# Patient Record
Sex: MALE | Race: WHITE | NOT HISPANIC OR LATINO | URBAN - METROPOLITAN AREA
[De-identification: names, ages, dates, MRNs, and addresses within clinical notes are randomized per-mention and may not be internally consistent; named-entity substitution may affect disease eponyms.]

---

## 2017-07-26 PROBLEM — 398050005 DIVERTICULAR DISEASE OF COLON: Status: ACTIVE | Noted: 2017-07-26

## 2017-07-26 PROBLEM — 38341003 HYPERTENSION: Status: ACTIVE | Noted: 2017-07-26

## 2017-07-26 PROBLEM — 13644009 HYPERCHOLESTEROLEMIA: Status: ACTIVE | Noted: 2017-07-26

## 2020-06-24 ENCOUNTER — OFFICE VISIT (OUTPATIENT)
Dept: URBAN - METROPOLITAN AREA CLINIC 13 | Facility: CLINIC | Age: 75
End: 2020-06-24

## 2020-07-09 ENCOUNTER — OFFICE VISIT (OUTPATIENT)
Dept: URBAN - METROPOLITAN AREA CLINIC 13 | Facility: CLINIC | Age: 75
End: 2020-07-09

## 2020-10-20 ENCOUNTER — OFFICE VISIT (OUTPATIENT)
Dept: URBAN - METROPOLITAN AREA CLINIC 13 | Facility: CLINIC | Age: 75
End: 2020-10-20

## 2021-02-01 ENCOUNTER — OFFICE VISIT (OUTPATIENT)
Dept: URBAN - METROPOLITAN AREA CLINIC 13 | Facility: CLINIC | Age: 76
End: 2021-02-01

## 2021-05-24 ENCOUNTER — OFFICE VISIT (OUTPATIENT)
Dept: URBAN - METROPOLITAN AREA CLINIC 13 | Facility: CLINIC | Age: 76
End: 2021-05-24

## 2021-08-17 ENCOUNTER — OFFICE VISIT (OUTPATIENT)
Dept: URBAN - METROPOLITAN AREA CLINIC 44 | Facility: CLINIC | Age: 76
End: 2021-08-17

## 2021-08-17 NOTE — HPI-H. PYLORI
76 year old male with a personal h/o  hemochromatosis and CRC in 1999 (Dr Ray did surgery). Last colonoscopy was in 2017 with diverticulosis and right hemicolectomy; in recall for surveillance 7/2022. He also has diagnosis of hemochromatosis (2001) which has been treated with phlebotomies up 2010 when his saturation and ferritin stabilized. Labs were just checked 6/8 with normal CBC and ferritin of 14.He had a hepatic function panel checked by Dr. Liu a week ago which was reviewed by me in EPIC chart, and LFT's are normal; lipid panel also unremarkable. He is doing well with no concerns. He denies any abdominal pain, change in bowels, blood in stool, or weight loss. He does take OTC Nexium daily for reflux with good control, and has had return of symptoms in the past when he has tried to wean off. Calcium and Vitamin D in February of this year were normal, and he takes a daily Calcium and Vitamin D supplement since the COVID pandemic began; he does not take a Vit C supplement as it increases iron absorption. He has not required phlebotomy in years.

## 2021-08-28 ENCOUNTER — TELEPHONE ENCOUNTER (OUTPATIENT)
Dept: URBAN - METROPOLITAN AREA CLINIC 13 | Facility: CLINIC | Age: 76
End: 2021-08-28

## 2021-08-29 ENCOUNTER — TELEPHONE ENCOUNTER (OUTPATIENT)
Dept: URBAN - METROPOLITAN AREA CLINIC 13 | Facility: CLINIC | Age: 76
End: 2021-08-29

## 2021-08-29 RX ORDER — ESOMEPRAZOLE MAGNESIUM 40 MG/1
GRANULE, DELAYED RELEASE ORAL
Status: ACTIVE | COMMUNITY

## 2021-08-29 RX ORDER — ENALAPRIL MALEATE 2.5 MG/1
TABLET ORAL
Status: ACTIVE | COMMUNITY

## 2021-08-29 RX ORDER — ASPIRIN 81 MG/1
TABLET ORAL
Status: ACTIVE | COMMUNITY

## 2021-08-29 RX ORDER — ROSUVASTATIN CALCIUM 5 MG
TABLET ORAL
Status: ACTIVE | COMMUNITY

## 2021-12-15 ENCOUNTER — OFFICE VISIT (OUTPATIENT)
Dept: URBAN - METROPOLITAN AREA CLINIC 44 | Facility: CLINIC | Age: 76
End: 2021-12-15
Payer: MEDICARE

## 2021-12-15 DIAGNOSIS — Z85.038 PERSONAL HISTORY OF COLON CANCER: ICD-10-CM

## 2021-12-15 DIAGNOSIS — E83.119 HEMOCHROMATOSIS: ICD-10-CM

## 2021-12-15 DIAGNOSIS — K57.30 DVRTCLOS OF LG INT W/O PERFORATION OR ABSCESS W/O BLEEDING: ICD-10-CM

## 2021-12-15 PROCEDURE — 99213 OFFICE O/P EST LOW 20 MIN: CPT | Performed by: INTERNAL MEDICINE

## 2021-12-15 RX ORDER — ROSUVASTATIN CALCIUM 5 MG
TABLET ORAL
Status: ACTIVE | COMMUNITY

## 2021-12-15 RX ORDER — ASPIRIN 81 MG/1
TABLET ORAL
Status: ACTIVE | COMMUNITY

## 2021-12-15 RX ORDER — CHOLECALCIFEROL (VITAMIN D3) 50 MCG
2 TABLET TABLET ORAL
Status: ACTIVE | COMMUNITY

## 2021-12-15 RX ORDER — ENALAPRIL MALEATE 2.5 MG/1
TABLET ORAL
Status: ACTIVE | COMMUNITY

## 2021-12-15 RX ORDER — ESOMEPRAZOLE MAGNESIUM 40 MG/1
GRANULE, DELAYED RELEASE ORAL
Status: ACTIVE | COMMUNITY

## 2021-12-15 RX ORDER — ZINC GLUCONATE 50 MG
1 TABLET TABLET ORAL ONCE A DAY
Status: ACTIVE | COMMUNITY

## 2021-12-15 NOTE — HPI-H. PYLORI
76 eduin old male with a personal history of hemochromatosis and CRC in 1999 (Dr Ray did surgery). Last colonoscopy was in 2017 with diverticulosis and right hemicolectomy; in recall for surveillance 7/2022. He also has diagnosis of hemochromatosis (2001) which has been treated with phlebotomies up 2010 when his saturation and ferritin stabilized. Labs have been checked regularly with normal CBC and low  ferritin. LFT's are normal; lipid panel also unremarkable. He is doing well with no concerns. He denies any abdominal pain, change in bowels, blood in stool, or weight loss. He does take OTC Nexium daily for reflux with good control, and has had return of symptoms in the past when he has tried to wean off. Calcium and vitamin D  were normal, and he takes a daily Calcium and vitamin D supplement since the COVID-19 pandemic began; he does not take a Vit C supplement as it increases iron absorption. He has not required phlebotomy in years. He is happy with his current GI status.   Labs in October show Hgb in normal range. Ferritin was low at 28.

## 2022-04-13 ENCOUNTER — TELEPHONE ENCOUNTER (OUTPATIENT)
Dept: URBAN - METROPOLITAN AREA CLINIC 44 | Facility: CLINIC | Age: 77
End: 2022-04-13

## 2022-04-13 PROBLEM — 399187006 HEMOCHROMATOSIS: Status: ACTIVE | Noted: 2022-04-13

## 2022-06-07 ENCOUNTER — TELEPHONE ENCOUNTER (OUTPATIENT)
Dept: URBAN - METROPOLITAN AREA CLINIC 44 | Facility: CLINIC | Age: 77
End: 2022-06-07

## 2022-06-07 VITALS — HEIGHT: 69 IN | BODY MASS INDEX: 23.7 KG/M2 | WEIGHT: 160 LBS

## 2022-06-07 RX ORDER — ROSUVASTATIN CALCIUM 5 MG
1 TABLET TABLET ORAL ONCE A DAY
Status: ACTIVE | COMMUNITY

## 2022-06-07 RX ORDER — ZINC GLUCONATE 50 MG
1 TABLET TABLET ORAL ONCE A DAY
Status: ACTIVE | COMMUNITY

## 2022-06-07 RX ORDER — ENALAPRIL MALEATE 2.5 MG/1
1 TABLET TABLET ORAL ONCE A DAY
Status: ACTIVE | COMMUNITY

## 2022-06-07 RX ORDER — CHOLECALCIFEROL (VITAMIN D3) 50 MCG
2 TABLET TABLET ORAL
Status: ACTIVE | COMMUNITY

## 2022-06-07 RX ORDER — ASPIRIN 81 MG/1
TABLET ORAL
Status: ACTIVE | COMMUNITY

## 2022-06-07 RX ORDER — ESOMEPRAZOLE MAGNESIUM 40 MG/1
1 CAPSULE CAPSULE, DELAYED RELEASE ORAL ONCE A DAY
Status: ACTIVE | COMMUNITY

## 2022-06-14 ENCOUNTER — TELEPHONE ENCOUNTER (OUTPATIENT)
Dept: URBAN - METROPOLITAN AREA CLINIC 46 | Facility: CLINIC | Age: 77
End: 2022-06-14

## 2022-06-14 ENCOUNTER — LAB OUTSIDE AN ENCOUNTER (OUTPATIENT)
Dept: URBAN - METROPOLITAN AREA CLINIC 44 | Facility: CLINIC | Age: 77
End: 2022-06-14

## 2023-04-25 ENCOUNTER — CLAIMS CREATED FROM THE CLAIM WINDOW (OUTPATIENT)
Dept: URBAN - METROPOLITAN AREA CLINIC 48 | Facility: CLINIC | Age: 78
End: 2023-04-25
Payer: MEDICARE

## 2023-04-25 ENCOUNTER — LAB OUTSIDE AN ENCOUNTER (OUTPATIENT)
Dept: URBAN - METROPOLITAN AREA CLINIC 48 | Facility: CLINIC | Age: 78
End: 2023-04-25

## 2023-04-25 VITALS
SYSTOLIC BLOOD PRESSURE: 174 MMHG | BODY MASS INDEX: 25.18 KG/M2 | DIASTOLIC BLOOD PRESSURE: 81 MMHG | HEART RATE: 65 BPM | WEIGHT: 170 LBS | HEIGHT: 69 IN | TEMPERATURE: 97.5 F

## 2023-04-25 DIAGNOSIS — K57.30 DVRTCLOS OF LG INT W/O PERFORATION OR ABSCESS W/O BLEEDING: ICD-10-CM

## 2023-04-25 DIAGNOSIS — E83.118 OTHER HEMOCHROMATOSIS: ICD-10-CM

## 2023-04-25 DIAGNOSIS — Z85.038 PERSONAL HISTORY OF COLON CANCER: ICD-10-CM

## 2023-04-25 DIAGNOSIS — K21.9 GASTROESOPHAGEAL REFLUX DISEASE WITHOUT ESOPHAGITIS: ICD-10-CM

## 2023-04-25 PROCEDURE — 99214 OFFICE O/P EST MOD 30 MIN: CPT | Performed by: NURSE PRACTITIONER

## 2023-04-25 RX ORDER — ESOMEPRAZOLE MAGNESIUM 20 MG/1
1 CAPSULE CAPSULE, DELAYED RELEASE ORAL ONCE A DAY
Status: ACTIVE | COMMUNITY

## 2023-04-25 RX ORDER — CHOLECALCIFEROL (VITAMIN D3) 50 MCG
2 TABLET TABLET ORAL
Status: ACTIVE | COMMUNITY

## 2023-04-25 RX ORDER — ESOMEPRAZOLE MAGNESIUM 20 MG/1
1 CAPSULE CAPSULE, DELAYED RELEASE ORAL ONCE A DAY
Qty: 30 | OUTPATIENT
Start: 2023-04-25

## 2023-04-25 RX ORDER — ASPIRIN 81 MG/1
TABLET ORAL
Status: ACTIVE | COMMUNITY

## 2023-04-25 RX ORDER — ZINC GLUCONATE 50 MG
1 TABLET TABLET ORAL ONCE A DAY
Status: ACTIVE | COMMUNITY

## 2023-04-25 RX ORDER — ROSUVASTATIN CALCIUM 5 MG
1 TABLET TABLET ORAL ONCE A DAY
Status: ACTIVE | COMMUNITY

## 2023-04-25 RX ORDER — ENALAPRIL MALEATE 2.5 MG/1
1 TABLET TABLET ORAL ONCE A DAY
Status: ACTIVE | COMMUNITY

## 2023-04-25 NOTE — HPI-TODAY'S VISIT:
77 year old male with a personal history of CRC s/p right hemicolectomy in 1999 (Dr Rya did surgery). Last colonoscopy was in 2017 with diverticulosis and right hemicolectomy. He also has diagnosis of hemochromatosis (2001) which has been treated with phlebotomies up 2010 when his saturation and ferritin stabilized. Labs have been checked regularly with normal CBC and low  ferritin. 3/3/23 iron panel showed iron 204, TIBC 300, and iron sat 68. 12/2022 Hgb was 15. LFTs are normal. He is doing well with no concerns. He denies any abdominal pain, change in bowels, blood in stool, or weight loss. GERD is well controlled with OTC Nexium daily. He does not take a Vit C supplement as it increases iron absorption.

## 2023-05-23 ENCOUNTER — OFFICE VISIT (OUTPATIENT)
Dept: URBAN - METROPOLITAN AREA CLINIC 44 | Facility: CLINIC | Age: 78
End: 2023-05-23

## 2023-06-06 ENCOUNTER — OUT OF OFFICE VISIT (OUTPATIENT)
Dept: URBAN - METROPOLITAN AREA SURGERY CENTER 28 | Facility: SURGERY CENTER | Age: 78
End: 2023-06-06
Payer: MEDICARE

## 2023-06-06 ENCOUNTER — CLAIMS CREATED FROM THE CLAIM WINDOW (OUTPATIENT)
Dept: URBAN - METROPOLITAN AREA CLINIC 4 | Facility: CLINIC | Age: 78
End: 2023-06-06
Payer: MEDICARE

## 2023-06-06 DIAGNOSIS — Z12.11 COLON CANCER SCREENING (HIGH RISK): ICD-10-CM

## 2023-06-06 DIAGNOSIS — D12.5 ADENOMATOUS POLYP OF SIGMOID COLON: ICD-10-CM

## 2023-06-06 DIAGNOSIS — Z85.038 PERSONAL HISTORY OF COLON CANCER: ICD-10-CM

## 2023-06-06 DIAGNOSIS — K63.5 BENIGN COLON POLYP: ICD-10-CM

## 2023-06-06 DIAGNOSIS — K63.89 OTHER SPECIFIED DISEASES OF INTESTINE: ICD-10-CM

## 2023-06-06 PROBLEM — 429699009: Status: ACTIVE | Noted: 2023-04-25

## 2023-06-06 PROCEDURE — 00811 ANES LWR INTST NDSC NOS: CPT | Performed by: NURSE ANESTHETIST, CERTIFIED REGISTERED

## 2023-06-06 PROCEDURE — 45380 COLONOSCOPY AND BIOPSY: CPT | Performed by: INTERNAL MEDICINE

## 2023-06-06 PROCEDURE — 88305 TISSUE EXAM BY PATHOLOGIST: CPT | Performed by: PATHOLOGY

## 2023-06-06 PROCEDURE — G8907 PT DOC NO EVENTS ON DISCHARG: HCPCS | Performed by: INTERNAL MEDICINE

## 2023-06-06 RX ORDER — ROSUVASTATIN CALCIUM 5 MG
1 TABLET TABLET ORAL ONCE A DAY
Status: ACTIVE | COMMUNITY

## 2023-06-06 RX ORDER — ZINC GLUCONATE 50 MG
1 TABLET TABLET ORAL ONCE A DAY
Status: ACTIVE | COMMUNITY

## 2023-06-06 RX ORDER — ASPIRIN 81 MG/1
TABLET ORAL
Status: ACTIVE | COMMUNITY

## 2023-06-06 RX ORDER — ENALAPRIL MALEATE 2.5 MG/1
1 TABLET TABLET ORAL ONCE A DAY
Status: ACTIVE | COMMUNITY

## 2023-06-06 RX ORDER — ESOMEPRAZOLE MAGNESIUM 20 MG/1
1 CAPSULE CAPSULE, DELAYED RELEASE ORAL ONCE A DAY
Qty: 30 | Status: ACTIVE | COMMUNITY
Start: 2023-04-25

## 2023-06-06 RX ORDER — CHOLECALCIFEROL (VITAMIN D3) 50 MCG
2 TABLET TABLET ORAL
Status: ACTIVE | COMMUNITY

## 2023-06-06 RX ORDER — ESOMEPRAZOLE MAGNESIUM 20 MG/1
1 CAPSULE CAPSULE, DELAYED RELEASE ORAL ONCE A DAY
Status: ACTIVE | COMMUNITY

## 2023-06-28 LAB
ABSOLUTE BASOPHILS: 29
ABSOLUTE EOSINOPHILS: 59
ABSOLUTE LYMPHOCYTES: 931
ABSOLUTE MONOCYTES: 451
ABSOLUTE NEUTROPHILS: 3430
BASOPHILS: 0.6
BUN/CREATININE RATIO: (no result)
CALCIUM: 9.2
CARBON DIOXIDE: 21
CHLORIDE: 108
CLIENT EDUCATION TRACKING: (no result)
COMMENT: (no result)
COMMENT: (no result)
CREATININE: 0.97
EGFR: 81
EOSINOPHILS: 1.2
FERRITIN, SERUM: 13
GLUCOSE: 96
HEMATOCRIT: 44.7
HEMOGLOBIN: 15.8
LYMPHOCYTES: 19
MCH: 34
MCHC: 35.3
MCV: 96.1
MONOCYTES: 9.2
MPV: 10.1
NEUTROPHILS: 70
PLATELET COUNT: 199
POTASSIUM: 4.2
QUESTION/PROBLEM:: (no result)
RDW: 12.3
RED BLOOD CELL COUNT: 4.65
SODIUM: 139
SPECIMEN(S) SUBMITTED:: (no result)
UNCLEAR ORDER:: (no result)
UREA NITROGEN (BUN): 15
WHITE BLOOD CELL COUNT: 4.9

## 2023-09-12 ENCOUNTER — TELEPHONE ENCOUNTER (OUTPATIENT)
Dept: URBAN - METROPOLITAN AREA CLINIC 46 | Facility: CLINIC | Age: 78
End: 2023-09-12

## 2023-09-20 LAB
FERRITIN, SERUM: 18
HEMATOCRIT: 50
HEMOGLOBIN: 16.4
IRON BIND.CAP.(TIBC): 286
IRON SATURATION: 43
IRON: 123
MCH: 32.2
MCHC: 32.8
MCV: 98.2
MPV: 10.6
PLATELET COUNT: 229
RDW: 12.4
RED BLOOD CELL COUNT: 5.09
WHITE BLOOD CELL COUNT: 5.4

## 2023-12-11 ENCOUNTER — DASHBOARD ENCOUNTERS (OUTPATIENT)
Age: 78
End: 2023-12-11

## 2023-12-12 ENCOUNTER — OFFICE VISIT (OUTPATIENT)
Dept: URBAN - METROPOLITAN AREA CLINIC 44 | Facility: CLINIC | Age: 78
End: 2023-12-12
Payer: MEDICARE

## 2023-12-12 ENCOUNTER — LAB OUTSIDE AN ENCOUNTER (OUTPATIENT)
Dept: URBAN - METROPOLITAN AREA CLINIC 44 | Facility: CLINIC | Age: 78
End: 2023-12-12

## 2023-12-12 VITALS
HEART RATE: 63 BPM | BODY MASS INDEX: 24.88 KG/M2 | TEMPERATURE: 98.1 F | DIASTOLIC BLOOD PRESSURE: 77 MMHG | HEIGHT: 69 IN | SYSTOLIC BLOOD PRESSURE: 138 MMHG | WEIGHT: 168 LBS | OXYGEN SATURATION: 97 %

## 2023-12-12 DIAGNOSIS — Z85.038 PERSONAL HISTORY OF COLON CANCER: ICD-10-CM

## 2023-12-12 DIAGNOSIS — K21.9 GASTROESOPHAGEAL REFLUX DISEASE, UNSPECIFIED WHETHER ESOPHAGITIS PRESENT: ICD-10-CM

## 2023-12-12 DIAGNOSIS — E83.119 HEMOCHROMATOSIS: ICD-10-CM

## 2023-12-12 DIAGNOSIS — E53.8 VITAMIN B 12 DEFICIENCY: ICD-10-CM

## 2023-12-12 PROBLEM — 235595009: Status: ACTIVE | Noted: 2023-12-12

## 2023-12-12 PROCEDURE — 99214 OFFICE O/P EST MOD 30 MIN: CPT | Performed by: INTERNAL MEDICINE

## 2023-12-12 RX ORDER — ESOMEPRAZOLE MAGNESIUM 20 MG/1
1 CAPSULE CAPSULE, DELAYED RELEASE ORAL ONCE A DAY
Qty: 30 | Status: ACTIVE | COMMUNITY
Start: 2023-04-25

## 2023-12-12 RX ORDER — ZINC GLUCONATE 50 MG
1 TABLET TABLET ORAL ONCE A DAY
Status: ACTIVE | COMMUNITY

## 2023-12-12 RX ORDER — ROSUVASTATIN CALCIUM 5 MG
1 TABLET TABLET ORAL ONCE A DAY
Status: ACTIVE | COMMUNITY

## 2023-12-12 RX ORDER — CHOLECALCIFEROL (VITAMIN D3) 50 MCG
2 TABLET TABLET ORAL
Status: ACTIVE | COMMUNITY

## 2023-12-12 RX ORDER — ESOMEPRAZOLE MAGNESIUM 20 MG/1
1 CAPSULE CAPSULE, DELAYED RELEASE ORAL ONCE A DAY
Status: ACTIVE | COMMUNITY

## 2023-12-12 RX ORDER — ENALAPRIL MALEATE 2.5 MG/1
1 TABLET TABLET ORAL ONCE A DAY
Status: ACTIVE | COMMUNITY

## 2023-12-12 RX ORDER — ASPIRIN 81 MG/1
1 TABLET TABLET ORAL ONCE A DAY
Status: ACTIVE | COMMUNITY

## 2023-12-12 NOTE — HPI-TODAY'S VISIT:
78 year old male with a personal history of CRC s/p right hemicolectomy in 1999 (Dr Ray did surgery). Last colonoscopy was 6/6/23 he had diverticulosis and multiple hyperplastic polyps. Colon 2017 with diverticulosis and right hemicolectomy. He also has diagnosis of hemochromatosis (2001) which has been treated with phlebotomies up 2010 when his saturation and ferritin stabilized. He does have reflux. He has been taking Nexium OTC years. His neurologist asked him to stop Nexium and begin Pepcid as his B-12 was low (76). He is on B-12 injections and oral B-12. He has never had an EGD. He was taking Nexium for reflux on his own.   Labs have been checked regularly with normal CBC and low  ferritin. 9/12/2023 Hgb 16.4, ferritin 16, iron 123. 3/3/23 iron panel showed iron 204, TIBC 300, and iron sat 68. 12/2022 Hgb was 15. LFTs are normal. He is doing well with no concerns. He denies any abdominal pain, change in bowels, or visible blood in stool.

## 2024-01-19 ENCOUNTER — CLAIMS CREATED FROM THE CLAIM WINDOW (OUTPATIENT)
Dept: URBAN - METROPOLITAN AREA SURGERY CENTER 27 | Facility: SURGERY CENTER | Age: 79
End: 2024-01-19
Payer: MEDICARE

## 2024-01-19 ENCOUNTER — CLAIMS CREATED FROM THE CLAIM WINDOW (OUTPATIENT)
Dept: URBAN - METROPOLITAN AREA CLINIC 4 | Facility: CLINIC | Age: 79
End: 2024-01-19
Payer: MEDICARE

## 2024-01-19 DIAGNOSIS — R12 HEARTBURN: ICD-10-CM

## 2024-01-19 DIAGNOSIS — K29.70 CHRONIC ACITVE GASTRITIS (H.PYLORI NEGATIVE): ICD-10-CM

## 2024-01-19 DIAGNOSIS — R12 BURNING REFLUX: ICD-10-CM

## 2024-01-19 DIAGNOSIS — K21.9 ESOPHAGEAL REFLUX: ICD-10-CM

## 2024-01-19 DIAGNOSIS — K29.70 GASTRITIS, UNSPECIFIED, WITHOUT BLEEDING: ICD-10-CM

## 2024-01-19 DIAGNOSIS — K31.89 ACHYLIA: ICD-10-CM

## 2024-01-19 DIAGNOSIS — K31.89 OTHER DISEASES OF STOMACH AND DUODENUM: ICD-10-CM

## 2024-01-19 DIAGNOSIS — K44.9 HIATAL HERNIA: ICD-10-CM

## 2024-01-19 DIAGNOSIS — K20.80 ESOPHAGITIS, LOS ANGELES GRADE A: ICD-10-CM

## 2024-01-19 PROCEDURE — G8907 PT DOC NO EVENTS ON DISCHARG: HCPCS | Performed by: CLINIC/CENTER

## 2024-01-19 PROCEDURE — 88342 IMHCHEM/IMCYTCHM 1ST ANTB: CPT | Performed by: PATHOLOGY

## 2024-01-19 PROCEDURE — 43239 EGD BIOPSY SINGLE/MULTIPLE: CPT | Performed by: INTERNAL MEDICINE

## 2024-01-19 PROCEDURE — 43239 EGD BIOPSY SINGLE/MULTIPLE: CPT | Performed by: CLINIC/CENTER

## 2024-01-19 PROCEDURE — 00731 ANES UPR GI NDSC PX NOS: CPT | Performed by: ANESTHESIOLOGY

## 2024-01-19 PROCEDURE — 88305 TISSUE EXAM BY PATHOLOGIST: CPT | Performed by: PATHOLOGY

## 2024-01-19 RX ORDER — ESOMEPRAZOLE MAGNESIUM 20 MG/1
1 CAPSULE CAPSULE, DELAYED RELEASE ORAL ONCE A DAY
Qty: 30 | Status: ACTIVE | COMMUNITY
Start: 2023-04-25

## 2024-01-19 RX ORDER — ASPIRIN 81 MG/1
1 TABLET TABLET ORAL ONCE A DAY
Status: ACTIVE | COMMUNITY

## 2024-01-19 RX ORDER — ROSUVASTATIN CALCIUM 5 MG
1 TABLET TABLET ORAL ONCE A DAY
Status: ACTIVE | COMMUNITY

## 2024-01-19 RX ORDER — DEXLANSOPRAZOLE 60 MG/1
1 CAPSULE CAPSULE, DELAYED RELEASE ORAL ONCE A DAY
Qty: 90 CAPSULE | Refills: 3 | OUTPATIENT
Start: 2024-01-19

## 2024-01-19 RX ORDER — ZINC GLUCONATE 50 MG
1 TABLET TABLET ORAL ONCE A DAY
Status: ACTIVE | COMMUNITY

## 2024-01-19 RX ORDER — ENALAPRIL MALEATE 2.5 MG/1
1 TABLET TABLET ORAL ONCE A DAY
Status: ACTIVE | COMMUNITY

## 2024-01-19 RX ORDER — CHOLECALCIFEROL (VITAMIN D3) 50 MCG
2 TABLET TABLET ORAL
Status: ACTIVE | COMMUNITY

## 2024-01-19 RX ORDER — ESOMEPRAZOLE MAGNESIUM 20 MG/1
1 CAPSULE CAPSULE, DELAYED RELEASE ORAL ONCE A DAY
Status: ACTIVE | COMMUNITY

## 2024-01-23 ENCOUNTER — TELEPHONE ENCOUNTER (OUTPATIENT)
Dept: URBAN - METROPOLITAN AREA CLINIC 46 | Facility: CLINIC | Age: 79
End: 2024-01-23

## 2024-07-17 ENCOUNTER — OFFICE VISIT (OUTPATIENT)
Dept: URBAN - METROPOLITAN AREA CLINIC 48 | Facility: CLINIC | Age: 79
End: 2024-07-17

## 2024-07-17 RX ORDER — DEXLANSOPRAZOLE 60 MG/1
1 CAPSULE CAPSULE, DELAYED RELEASE ORAL ONCE A DAY
Qty: 90 CAPSULE | Refills: 3 | COMMUNITY
Start: 2024-01-19

## 2024-07-17 RX ORDER — ZINC GLUCONATE 50 MG
1 TABLET TABLET ORAL ONCE A DAY
Status: ACTIVE | COMMUNITY

## 2024-07-17 RX ORDER — ENALAPRIL MALEATE 2.5 MG/1
1 TABLET TABLET ORAL ONCE A DAY
Status: ACTIVE | COMMUNITY

## 2024-07-17 RX ORDER — ASPIRIN 81 MG/1
1 TABLET TABLET ORAL ONCE A DAY
Status: ACTIVE | COMMUNITY

## 2024-07-17 RX ORDER — ROSUVASTATIN CALCIUM 5 MG
1 TABLET TABLET ORAL ONCE A DAY
Status: ACTIVE | COMMUNITY

## 2024-07-17 RX ORDER — CALCIUM CARBONATE/VITAMIN D2 250 MG-125
1 TABLET TABLET ORAL TWICE A DAY
Status: ACTIVE | COMMUNITY

## 2024-07-17 RX ORDER — ESOMEPRAZOLE MAGNESIUM 20 MG/1
1 CAPSULE CAPSULE, DELAYED RELEASE ORAL ONCE A DAY
Qty: 30 | Status: ACTIVE | COMMUNITY
Start: 2023-04-25

## 2024-07-17 NOTE — HPI-TODAY'S VISIT:
79 year old male with presents for OV to discuss medication refills. He has a personal history of CRC s/p right hemicolectomy in 1999 (Dr Ray did surgery). Last colonoscopy was 6/6/23 he had diverticulosis and multiple hyperplastic polyps; repeat exam advised in 3-5 years. Colon 2017 with diverticulosis and right hemicolectomy. He also has diagnosis of hemochromatosis (2001) which has been treated with phlebotomies up 2010 when his saturation and ferritin stabilized. He does have reflux. He has been taking Nexium OTC years. His neurologist asked him to stop Nexium and begin Pepcid as his B-12 was low (76). He is on B-12 injections and oral B-12. EGD was done 1/2024 with grade A esophagitis, H. 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 Nexium was advised to be increased to BID.   Labs have been checked regularly with normal CBC and low  ferritin. 9/12/2023 Hgb 16.4, ferritin 16, iron 123. 3/3/23 iron panel showed iron 204, TIBC 300, and iron sat 68. 12/2022 Hgb was 15. LFTs are normal. He is doing well with no concerns.

## 2024-08-20 ENCOUNTER — OFFICE VISIT (OUTPATIENT)
Dept: URBAN - METROPOLITAN AREA CLINIC 48 | Facility: CLINIC | Age: 79
End: 2024-08-20
Payer: MEDICARE

## 2024-08-20 VITALS
OXYGEN SATURATION: 96 % | HEIGHT: 69 IN | WEIGHT: 164.4 LBS | SYSTOLIC BLOOD PRESSURE: 151 MMHG | DIASTOLIC BLOOD PRESSURE: 84 MMHG | BODY MASS INDEX: 24.35 KG/M2 | HEART RATE: 67 BPM | TEMPERATURE: 98.8 F

## 2024-08-20 DIAGNOSIS — Z85.038 PERSONAL HISTORY OF COLON CANCER: ICD-10-CM

## 2024-08-20 DIAGNOSIS — E83.119 HEMOCHROMATOSIS: ICD-10-CM

## 2024-08-20 DIAGNOSIS — E53.8 VITAMIN B 12 DEFICIENCY: ICD-10-CM

## 2024-08-20 DIAGNOSIS — K21.9 GASTROESOPHAGEAL REFLUX DISEASE, UNSPECIFIED WHETHER ESOPHAGITIS PRESENT: ICD-10-CM

## 2024-08-20 PROCEDURE — 99213 OFFICE O/P EST LOW 20 MIN: CPT | Performed by: PHYSICIAN ASSISTANT

## 2024-08-20 RX ORDER — ONDANSETRON 4 MG/1
TAKE 1 TABLET UNDER THE TONGUE EVERY 4 HOURS AS NEEDED FOR NAUSEA TABLET, ORALLY DISINTEGRATING ORAL
Qty: 20 EACH | Refills: 0 | Status: ACTIVE | COMMUNITY

## 2024-08-20 RX ORDER — DIPHENOXYLATE HYDROCHLORIDE AND ATROPINE SULFATE 2.5; .025 MG/1; MG/1
1 TABLET AS NEEDED TABLET ORAL
Qty: 20 TABLET | Status: ACTIVE | COMMUNITY

## 2024-08-20 RX ORDER — ENALAPRIL MALEATE 2.5 MG/1
1 TABLET TABLET ORAL ONCE A DAY
Status: ACTIVE | COMMUNITY

## 2024-08-20 RX ORDER — ASPIRIN 81 MG/1
1 TABLET TABLET ORAL ONCE A DAY
Status: ACTIVE | COMMUNITY

## 2024-08-20 RX ORDER — CHOLECALCIFEROL (VITAMIN D3) 50 MCG
2 TABLET TABLET ORAL
Status: ACTIVE | COMMUNITY

## 2024-08-20 RX ORDER — CALCIUM CARBONATE/VITAMIN D2 250 MG-125
1 TABLET TABLET ORAL TWICE A DAY
Status: ACTIVE | COMMUNITY

## 2024-08-20 RX ORDER — ESOMEPRAZOLE MAGNESIUM 20 MG/1
1 CAPSULE CAPSULE, DELAYED RELEASE ORAL ONCE A DAY
Qty: 30 | Status: ON HOLD | COMMUNITY
Start: 2023-04-25

## 2024-08-20 RX ORDER — DEXLANSOPRAZOLE 60 MG/1
1 CAPSULE CAPSULE, DELAYED RELEASE ORAL ONCE A DAY
Qty: 90 CAPSULE | Refills: 3 | Status: ACTIVE | COMMUNITY
Start: 2024-01-19

## 2024-08-20 RX ORDER — ROSUVASTATIN CALCIUM 5 MG
1 TABLET TABLET ORAL ONCE A DAY
Status: ACTIVE | COMMUNITY

## 2024-08-20 RX ORDER — ZINC GLUCONATE 50 MG
1 TABLET TABLET ORAL ONCE A DAY
Status: ACTIVE | COMMUNITY

## 2024-08-20 RX ORDER — DONEPEZIL HYDROCHLORIDE 5 MG/1
TAKE 1 TABLET BY MOUTH EVERY DAY FOR 7 DAYS, THEN TAKE 1 TABLET TWICE DAILY TABLET, FILM COATED ORAL
Qty: 60 EACH | Refills: 1 | Status: ACTIVE | COMMUNITY

## 2024-08-20 NOTE — HPI-TODAY'S VISIT:
79 year old male with presents for OV to discuss medication refills. He has a personal history of CRC s/p right hemicolectomy in 1999 (Dr Ray did surgery). Last colonoscopy was 6/6/23 he had diverticulosis and multiple hyperplastic polyps; repeat exam advised in 3-5 years. Colon 2017 with diverticulosis and right hemicolectomy. He also has diagnosis of hemochromatosis (2001) which has been treated with phlebotomies up 2010 when his saturation and ferritin stabilized. He does have reflux. He has been taking Nexium OTC years. His neurologist asked him to stop Nexium and begin Pepcid as his B-12 was low (76). He is on B-12 injections and oral B-12. EGD was done 1/2024 with grade A esophagitis, H. 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 Nexium was advised to be increased to BID, but since then he was started on Dexlansoprazole. Initially, he had break through reflux with this and took Tums. This has overall subsided, but he feels he has more belching/flatus since being on this medication. He otherwise feels well without GI-related complaints. His wife states he was started on Aricept by his PCP about a month ago. Appetite is some decreased but stable, and he states he weight fluctuates between 165-170 pounds.   Labs have been checked regularly with normal CBC and low  ferritin. 9/12/2023 Hgb 16.4, ferritin 16, iron 123. 3/3/23 iron panel showed iron 204, TIBC 300, and iron sat 68. 12/2022 Hgb was 15. Labs 7/8/24 showed an unremarkable CBC and CMP; LFT's were normal. No Ferritin was checked at that time. Vit B12 was at the low end of normal (254) and he has not been taking a supplement; has not seen neurology for about a year.

## 2024-09-05 ENCOUNTER — TELEPHONE ENCOUNTER (OUTPATIENT)
Dept: URBAN - METROPOLITAN AREA CLINIC 44 | Facility: CLINIC | Age: 79
End: 2024-09-05

## 2025-02-18 ENCOUNTER — OFFICE VISIT (OUTPATIENT)
Dept: URBAN - METROPOLITAN AREA CLINIC 44 | Facility: CLINIC | Age: 80
End: 2025-02-18

## 2025-02-18 VITALS
SYSTOLIC BLOOD PRESSURE: 162 MMHG | HEART RATE: 57 BPM | DIASTOLIC BLOOD PRESSURE: 78 MMHG | TEMPERATURE: 96.8 F | WEIGHT: 163.8 LBS | HEIGHT: 69 IN | BODY MASS INDEX: 24.26 KG/M2

## 2025-02-18 PROBLEM — 40719004: Status: ACTIVE | Noted: 2025-02-18

## 2025-02-18 RX ORDER — ASPIRIN 81 MG/1
1 TABLET TABLET ORAL ONCE A DAY
Status: ACTIVE | COMMUNITY

## 2025-02-18 RX ORDER — ENALAPRIL MALEATE 2.5 MG/1
1 TABLET TABLET ORAL ONCE A DAY
Status: ACTIVE | COMMUNITY

## 2025-02-18 RX ORDER — ROSUVASTATIN CALCIUM 5 MG
1 TABLET TABLET ORAL ONCE A DAY
Status: ACTIVE | COMMUNITY

## 2025-02-18 RX ORDER — DONEPEZIL HYDROCHLORIDE 5 MG/1
1 TABLET AT BEDTIME TABLET, FILM COATED ORAL TWICE A DAY
Qty: 60 TABLET | Refills: 1 | Status: ACTIVE | COMMUNITY

## 2025-02-18 RX ORDER — ONDANSETRON 4 MG/1
1 TABLET ON THE TONGUE AND ALLOW TO DISSOLVE TABLET, ORALLY DISINTEGRATING ORAL
Refills: 0 | Status: ACTIVE | COMMUNITY

## 2025-02-18 RX ORDER — ZINC GLUCONATE 50 MG
1 TABLET TABLET ORAL ONCE A DAY
Status: ACTIVE | COMMUNITY

## 2025-02-18 RX ORDER — DEXLANSOPRAZOLE 60 MG/1
1 CAPSULE CAPSULE, DELAYED RELEASE ORAL ONCE A DAY
Qty: 90 CAPSULE | Refills: 3 | Status: ACTIVE | COMMUNITY
Start: 2024-01-19

## 2025-02-18 RX ORDER — FLUOROURACIL 50 MG/G
1 APPLICATION CREAM TOPICAL TWICE A DAY
Refills: 0 | Status: ACTIVE | COMMUNITY

## 2025-02-18 RX ORDER — DIPHENOXYLATE HYDROCHLORIDE AND ATROPINE SULFATE 2.5; .025 MG/1; MG/1
1 TABLET AS NEEDED TABLET ORAL
Qty: 20 TABLET | Status: ACTIVE | COMMUNITY

## 2025-02-18 RX ORDER — CHOLECALCIFEROL (VITAMIN D3) 50 MCG
2 TABLET TABLET ORAL
Status: ACTIVE | COMMUNITY

## 2025-02-18 RX ORDER — CALCIUM CARBONATE/VITAMIN D2 250 MG-125
1 TABLET TABLET ORAL TWICE A DAY
Status: ACTIVE | COMMUNITY

## 2025-02-18 NOTE — HPI-TODAY'S VISIT:
79 year old male with presents for OV to discuss medication refills. He has a personal history of CRC s/p right hemicolectomy in 1999 (Dr Ray did surgery). EGD showed LA Grade B esophagitis. He is on PPI and is feeling much better. No reflux or dysphagia. No visilble signs of blood loss. Lab work with Vitamin D and B-12 were good.   Last colonoscopy was 6/6/23 he had diverticulosis and multiple hyperplastic polyps; repeat exam advised in 3-5 years. Colon 2017 with diverticulosis and right hemicolectomy. He also has diagnosis of hemochromatosis (2001) which has been treated with phlebotomies up 2010 when his saturation and ferritin stabilized. He does have reflux. He has been taking Nexium OTC years. His neurologist asked him to stop Nexium and begin Pepcid as his B-12 was low (76). He is on B-12 injections and oral B-12. EGD was done 1/2024 with grade A esophagitis, H. 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 Nexium was advised to be increased to BID, but since then he was started on Dexlansoprazole. Initially, he had break through reflux with this and took Tums. This has overall subsided, but he feels he has more belching/flatus since being on this medication. He otherwise feels well without GI-related complaints. His wife states he was started on Aricept by his PCP about a month ago. Appetite is some decreased but stable, and he states he weight fluctuates between 165-170 pounds.   Labs have been checked regularly with normal CBC and low  ferritin. 9/12/2023 Hgb 16.4, ferritin 16, iron 123. 3/3/23 iron panel showed iron 204, TIBC 300, and iron sat 68. 12/2022 Hgb was 15. Labs 7/8/24 showed an unremarkable CBC and CMP; LFT's were normal. No Ferritin was checked at that time. Vit B12 was at the low end of normal (254) and he has not been taking a supplement; has not seen neurology for about a year.

## 2025-08-26 ENCOUNTER — OFFICE VISIT (OUTPATIENT)
Dept: URBAN - METROPOLITAN AREA CLINIC 44 | Facility: CLINIC | Age: 80
End: 2025-08-26
Payer: MEDICARE

## 2025-08-26 ENCOUNTER — LAB OUTSIDE AN ENCOUNTER (OUTPATIENT)
Dept: URBAN - METROPOLITAN AREA CLINIC 44 | Facility: CLINIC | Age: 80
End: 2025-08-26

## 2025-08-26 DIAGNOSIS — K57.30 DVRTCLOS OF LG INT W/O PERFORATION OR ABSCESS W/O BLEEDING: ICD-10-CM

## 2025-08-26 DIAGNOSIS — Z85.038 PERSONAL HISTORY OF COLON CANCER: ICD-10-CM

## 2025-08-26 DIAGNOSIS — K22.10 EROSIVE ESOPHAGITIS: ICD-10-CM

## 2025-08-26 DIAGNOSIS — E83.119 HEMOCHROMATOSIS: ICD-10-CM

## 2025-08-26 PROCEDURE — 99214 OFFICE O/P EST MOD 30 MIN: CPT | Performed by: INTERNAL MEDICINE

## 2025-08-26 RX ORDER — DONEPEZIL HYDROCHLORIDE 5 MG/1
1 TABLET AT BEDTIME TABLET, FILM COATED ORAL TWICE A DAY
Qty: 60 TABLET | Refills: 1 | Status: ACTIVE | COMMUNITY

## 2025-08-26 RX ORDER — CHOLECALCIFEROL (VITAMIN D3) 50 MCG
2 TABLET TABLET ORAL
Status: ACTIVE | COMMUNITY

## 2025-08-26 RX ORDER — FAMOTIDINE 40 MG/1
1 TABLET TABLET, FILM COATED ORAL TWICE A DAY
Qty: 180 TABLET | Refills: 3 | OUTPATIENT
Start: 2025-08-26

## 2025-08-26 RX ORDER — CALCIUM CARBONATE/VITAMIN D2 250 MG-125
1 TABLET TABLET ORAL TWICE A DAY
Status: ACTIVE | COMMUNITY

## 2025-08-26 RX ORDER — ENALAPRIL MALEATE 2.5 MG/1
1 TABLET TABLET ORAL ONCE A DAY
Status: ACTIVE | COMMUNITY

## 2025-08-26 RX ORDER — DEXLANSOPRAZOLE 60 MG/1
1 CAPSULE CAPSULE, DELAYED RELEASE ORAL ONCE A DAY
Qty: 90 CAPSULE | Refills: 3 | Status: ACTIVE | COMMUNITY
Start: 2024-01-19

## 2025-08-26 RX ORDER — FLUOROURACIL 50 MG/G
1 APPLICATION CREAM TOPICAL TWICE A DAY
Refills: 0 | Status: ACTIVE | COMMUNITY